# Patient Record
Sex: MALE | Race: WHITE
[De-identification: names, ages, dates, MRNs, and addresses within clinical notes are randomized per-mention and may not be internally consistent; named-entity substitution may affect disease eponyms.]

---

## 2022-02-04 ENCOUNTER — HOSPITAL ENCOUNTER (EMERGENCY)
Dept: HOSPITAL 46 - ED | Age: 47
Discharge: HOME | End: 2022-02-04
Payer: COMMERCIAL

## 2022-02-04 VITALS — BODY MASS INDEX: 27.83 KG/M2 | WEIGHT: 209.99 LBS | HEIGHT: 73 IN

## 2022-02-04 DIAGNOSIS — N23: Primary | ICD-10-CM

## 2022-02-04 DIAGNOSIS — Z88.0: ICD-10-CM

## 2022-02-04 NOTE — XMS
PreManage Notification: GENIE YI MRN:[id number]
 
Security Information
 
Security Events
No recent Security Events currently on file
 
 
 
CRITERIA MET
------------
- [facility] - 2 Visits in 30 Days
 
 
CARE PROVIDERS
-------------------------------------------------------------------------------------
VEE              [facility] -
 
PHONE: [phone]
-------------------------------------------------------------------------------------
KAVITA CHINO     [facility]     Current
 
PHONE: Unknown
-------------------------------------------------------------------------------------
 
Alyssa has no Care Guidelines for this patient.
 
ENERIS VISIT COUNT (12 MO.)
-------------------------------------------------------------------------------------
[facility]
 
[address] [place] [address]
-------------------------------------------------------------------------------------
TOTAL 6
-------------------------------------------------------------------------------------
NOTE: Visits indicate total known visits.
 
ED/UCC VISIT TRACKING (12 MO.)
-------------------------------------------------------------------------------------
02/04/2022 19:01
[facility]
 
TYPE: Emergency
 
COMPLAINT:
- L FLANK PAIN, GROIN PAIN
-------------------------------------------------------------------------------------
01/12/2022 15:07
[facility] [place]
 
TYPE: Emergency
 
DIAGNOSES:
- Other chest pain
 
- Chest Pain
-------------------------------------------------------------------------------------
11/30/2021 02:57
[facility] [place]
 
TYPE: Emergency
 
DIAGNOSES:
- Hypoxemia
- Shortness of Breath
- Pneumonia, unspecified organism
-------------------------------------------------------------------------------------
11/28/2021 13:50
[facility] [place] [facility]
 
TYPE: Emergency
 
DIAGNOSES:
- Pneumonia, unspecified organism
- Fever (9 Weeks To 74 Years)
- Vomiting (Severe)
-------------------------------------------------------------------------------------
05[facility]23/2021 16:49
[facility]     [place]
 
TYPE: Emergency
 
DIAGNOSES:
- stomach pain,
- stomack pain,
- Generalized abdominal pain
-------------------------------------------------------------------------------------
05/22/2021 00:49
[facility]     [place]
 
TYPE: Emergency
 
DIAGNOSES:
- Diarrhea, unspecified
- Nausea with vomiting, unspecified
- FEVER / VOMITING
-------------------------------------------------------------------------------------
 
 
INPATIENT VISIT TRACKING (12 MO.)
-------------------------------------------------------------------------------------
11/30/2021 02:57
[facility]
 
TYPE: Orthopedic
 
DIAGNOSES:
- Pneumonia, unspecified organism
- Hypoxemia
-------------------------------------------------------------------------------------
 
[email or web address]

## 2022-05-22 ENCOUNTER — HOSPITAL ENCOUNTER (EMERGENCY)
Dept: HOSPITAL 46 - ED | Age: 47
Discharge: HOME | End: 2022-05-22
Payer: COMMERCIAL

## 2022-05-22 VITALS — WEIGHT: 208.01 LBS | BODY MASS INDEX: 27.57 KG/M2 | HEIGHT: 73 IN

## 2022-05-22 DIAGNOSIS — R10.31: Primary | ICD-10-CM

## 2022-05-22 DIAGNOSIS — Z88.0: ICD-10-CM

## 2022-05-22 DIAGNOSIS — Z79.899: ICD-10-CM
